# Patient Record
Sex: FEMALE | Race: WHITE | NOT HISPANIC OR LATINO | Employment: UNEMPLOYED | URBAN - NONMETROPOLITAN AREA
[De-identification: names, ages, dates, MRNs, and addresses within clinical notes are randomized per-mention and may not be internally consistent; named-entity substitution may affect disease eponyms.]

---

## 2023-06-26 ENCOUNTER — OFFICE VISIT (OUTPATIENT)
Dept: PRIMARY CARE | Facility: CLINIC | Age: 63
End: 2023-06-26
Payer: COMMERCIAL

## 2023-06-26 VITALS
WEIGHT: 149 LBS | DIASTOLIC BLOOD PRESSURE: 64 MMHG | HEIGHT: 60 IN | BODY MASS INDEX: 29.25 KG/M2 | HEART RATE: 81 BPM | OXYGEN SATURATION: 97 % | SYSTOLIC BLOOD PRESSURE: 110 MMHG

## 2023-06-26 DIAGNOSIS — F51.04 PSYCHOPHYSIOLOGICAL INSOMNIA: ICD-10-CM

## 2023-06-26 DIAGNOSIS — Z12.31 SCREENING MAMMOGRAM, ENCOUNTER FOR: ICD-10-CM

## 2023-06-26 DIAGNOSIS — G89.29 CHRONIC MIDLINE LOW BACK PAIN WITHOUT SCIATICA: ICD-10-CM

## 2023-06-26 DIAGNOSIS — E78.00 PURE HYPERCHOLESTEROLEMIA: ICD-10-CM

## 2023-06-26 DIAGNOSIS — M54.50 CHRONIC MIDLINE LOW BACK PAIN WITHOUT SCIATICA: ICD-10-CM

## 2023-06-26 DIAGNOSIS — Z12.11 COLON CANCER SCREENING: ICD-10-CM

## 2023-06-26 DIAGNOSIS — Z13.220 LIPID SCREENING: ICD-10-CM

## 2023-06-26 DIAGNOSIS — Z13.0 SCREENING FOR DEFICIENCY ANEMIA: ICD-10-CM

## 2023-06-26 DIAGNOSIS — Z00.00 ROUTINE GENERAL MEDICAL EXAMINATION AT A HEALTH CARE FACILITY: Primary | ICD-10-CM

## 2023-06-26 DIAGNOSIS — F40.240 CLAUSTROPHOBIA: ICD-10-CM

## 2023-06-26 DIAGNOSIS — Z13.1 DIABETES MELLITUS SCREENING: ICD-10-CM

## 2023-06-26 LAB
ALANINE AMINOTRANSFERASE (SGPT) (U/L) IN SER/PLAS: 16 U/L (ref 7–45)
ALBUMIN (G/DL) IN SER/PLAS: 4.2 G/DL (ref 3.4–5)
ALKALINE PHOSPHATASE (U/L) IN SER/PLAS: 55 U/L (ref 33–136)
ANION GAP IN SER/PLAS: 13 MMOL/L (ref 10–20)
ASPARTATE AMINOTRANSFERASE (SGOT) (U/L) IN SER/PLAS: 14 U/L (ref 9–39)
BILIRUBIN TOTAL (MG/DL) IN SER/PLAS: 0.3 MG/DL (ref 0–1.2)
CALCIUM (MG/DL) IN SER/PLAS: 9.1 MG/DL (ref 8.6–10.3)
CARBON DIOXIDE, TOTAL (MMOL/L) IN SER/PLAS: 28 MMOL/L (ref 21–32)
CHLORIDE (MMOL/L) IN SER/PLAS: 103 MMOL/L (ref 98–107)
CHOLESTEROL (MG/DL) IN SER/PLAS: 178 MG/DL (ref 0–199)
CHOLESTEROL IN HDL (MG/DL) IN SER/PLAS: 68.5 MG/DL
CHOLESTEROL/HDL RATIO: 2.6
CREATININE (MG/DL) IN SER/PLAS: 0.89 MG/DL (ref 0.5–1.05)
ERYTHROCYTE DISTRIBUTION WIDTH (RATIO) BY AUTOMATED COUNT: 12.4 % (ref 11.5–14.5)
ERYTHROCYTE MEAN CORPUSCULAR HEMOGLOBIN CONCENTRATION (G/DL) BY AUTOMATED: 32.4 G/DL (ref 32–36)
ERYTHROCYTE MEAN CORPUSCULAR VOLUME (FL) BY AUTOMATED COUNT: 93 FL (ref 80–100)
ERYTHROCYTES (10*6/UL) IN BLOOD BY AUTOMATED COUNT: 4.9 X10E12/L (ref 4–5.2)
GFR FEMALE: 73 ML/MIN/1.73M2
GLUCOSE (MG/DL) IN SER/PLAS: 94 MG/DL (ref 74–99)
HEMATOCRIT (%) IN BLOOD BY AUTOMATED COUNT: 45.4 % (ref 36–46)
HEMOGLOBIN (G/DL) IN BLOOD: 14.7 G/DL (ref 12–16)
LDL: 76 MG/DL (ref 0–99)
LEUKOCYTES (10*3/UL) IN BLOOD BY AUTOMATED COUNT: 5.7 X10E9/L (ref 4.4–11.3)
PLATELETS (10*3/UL) IN BLOOD AUTOMATED COUNT: 276 X10E9/L (ref 150–450)
POTASSIUM (MMOL/L) IN SER/PLAS: 4.2 MMOL/L (ref 3.5–5.3)
PROTEIN TOTAL: 6.1 G/DL (ref 6.4–8.2)
SODIUM (MMOL/L) IN SER/PLAS: 140 MMOL/L (ref 136–145)
TRIGLYCERIDE (MG/DL) IN SER/PLAS: 166 MG/DL (ref 0–149)
UREA NITROGEN (MG/DL) IN SER/PLAS: 14 MG/DL (ref 6–23)
VLDL: 33 MG/DL (ref 0–40)

## 2023-06-26 PROCEDURE — 85027 COMPLETE CBC AUTOMATED: CPT

## 2023-06-26 PROCEDURE — 80061 LIPID PANEL: CPT

## 2023-06-26 PROCEDURE — 99396 PREV VISIT EST AGE 40-64: CPT | Performed by: FAMILY MEDICINE

## 2023-06-26 PROCEDURE — 1036F TOBACCO NON-USER: CPT | Performed by: FAMILY MEDICINE

## 2023-06-26 PROCEDURE — 80053 COMPREHEN METABOLIC PANEL: CPT

## 2023-06-26 RX ORDER — ROSUVASTATIN CALCIUM 10 MG/1
10 TABLET, COATED ORAL DAILY
COMMUNITY
End: 2023-06-26 | Stop reason: SDUPTHER

## 2023-06-26 RX ORDER — ROSUVASTATIN CALCIUM 10 MG/1
10 TABLET, COATED ORAL DAILY
Qty: 90 TABLET | Refills: 3 | Status: SHIPPED | OUTPATIENT
Start: 2023-06-26

## 2023-06-26 RX ORDER — ASPIRIN 81 MG/1
1 TABLET ORAL DAILY
COMMUNITY
Start: 2021-01-13

## 2023-06-26 RX ORDER — HYDROXYZINE PAMOATE 50 MG/1
50-100 CAPSULE ORAL NIGHTLY PRN
Qty: 60 CAPSULE | Refills: 2 | Status: SHIPPED | OUTPATIENT
Start: 2023-06-26 | End: 2023-09-24

## 2023-06-26 RX ORDER — ALPRAZOLAM 0.25 MG/1
0.25 TABLET ORAL 3 TIMES DAILY PRN
Qty: 4 TABLET | Refills: 0 | Status: SHIPPED | OUTPATIENT
Start: 2023-06-26 | End: 2024-02-21

## 2023-06-26 ASSESSMENT — PATIENT HEALTH QUESTIONNAIRE - PHQ9
2. FEELING DOWN, DEPRESSED OR HOPELESS: NOT AT ALL
1. LITTLE INTEREST OR PLEASURE IN DOING THINGS: NOT AT ALL
SUM OF ALL RESPONSES TO PHQ9 QUESTIONS 1 AND 2: 0

## 2023-06-26 ASSESSMENT — ENCOUNTER SYMPTOMS
FEVER: 0
SHORTNESS OF BREATH: 0
CONFUSION: 0
PALPITATIONS: 0
NERVOUS/ANXIOUS: 0
COLOR CHANGE: 0
TROUBLE SWALLOWING: 0
BLOOD IN STOOL: 0
CONSTIPATION: 0
HEMATURIA: 0
UNEXPECTED WEIGHT CHANGE: 0
APPETITE CHANGE: 0
DIARRHEA: 0
BACK PAIN: 1
SEIZURES: 0
DIFFICULTY URINATING: 0
JOINT SWELLING: 0

## 2023-06-26 NOTE — PROGRESS NOTES
"Subjective   Patient ID: Mayra Tena is a 62 y.o. female who presents for Hyperlipidemia and Wellness visit.  1. Hyperlipidemia:  Taking crestor w/o problems    2. LBP:  At least 15y of pain but increasing  Tried to get MRI  but claustrophobia  No radiculopathy  Increased w/ sitting/standing/bending  No trouble hold bowels/urine   Using pain patch, otc meds    3. Having hard time sleeping:;  Waking q2hr  Was on trazodone w/o improvement  States her mind is racing          Review of Systems   Constitutional:  Negative for appetite change, fever and unexpected weight change.   HENT:  Negative for congestion and trouble swallowing.    Eyes:  Negative for visual disturbance.   Respiratory:  Negative for shortness of breath.    Cardiovascular:  Negative for chest pain, palpitations and leg swelling.   Gastrointestinal:  Negative for blood in stool, constipation and diarrhea.   Genitourinary:  Negative for difficulty urinating and hematuria.   Musculoskeletal:  Positive for back pain. Negative for gait problem and joint swelling.   Skin:  Negative for color change.   Allergic/Immunologic: Negative for immunocompromised state.   Neurological:  Negative for seizures and syncope.   Psychiatric/Behavioral:  Negative for confusion and suicidal ideas. The patient is not nervous/anxious.        Objective   /64   Pulse 81   Ht 1.511 m (4' 11.5\")   Wt 67.6 kg (149 lb)   SpO2 97%   BMI 29.59 kg/m²     Physical Exam  Constitutional:       General: She is not in acute distress.     Appearance: Normal appearance. She is not ill-appearing.   HENT:      Head: Normocephalic and atraumatic.      Right Ear: Tympanic membrane normal.      Left Ear: Tympanic membrane normal.      Nose: Nose normal.      Mouth/Throat:      Mouth: Mucous membranes are moist.   Eyes:      Pupils: Pupils are equal, round, and reactive to light.   Cardiovascular:      Rate and Rhythm: Normal rate and regular rhythm.      Heart sounds: No murmur " heard.     No friction rub. No gallop.   Pulmonary:      Effort: Pulmonary effort is normal.      Breath sounds: Normal breath sounds.   Abdominal:      General: Abdomen is flat. There is no distension.      Palpations: Abdomen is soft.      Tenderness: There is no abdominal tenderness. There is no guarding.      Hernia: No hernia is present.   Musculoskeletal:         General: Normal range of motion.      Comments: +straight leg raise, slow but normal gait, increase pain w/ bending. 5/5 LE strength    Skin:     General: Skin is warm and dry.   Neurological:      General: No focal deficit present.      Mental Status: She is alert. Mental status is at baseline.      Cranial Nerves: No cranial nerve deficit.      Motor: No weakness.      Gait: Gait normal.   Psychiatric:         Mood and Affect: Mood normal.         Behavior: Behavior normal.         Thought Content: Thought content normal.         Judgment: Judgment normal.         Assessment/Plan   Problem List Items Addressed This Visit    None  Visit Diagnoses       Screening for deficiency anemia    -  Primary    Relevant Orders    CBC    Diabetes mellitus screening        Relevant Orders    Comprehensive Metabolic Panel    Lipid screening        Relevant Orders    Lipid Panel    Colon cancer screening        Relevant Orders    Cologuard® colon cancer screening    Screening mammogram, encounter for        Relevant Orders    BI mammo bilateral screening tomosynthesis    Psychophysiological insomnia        Relevant Medications    hydrOXYzine pamoate (VistariL) 50 mg capsule    Pure hypercholesterolemia        Relevant Medications    rosuvastatin (Crestor) 10 mg tablet    Claustrophobia        Chronic midline low back pain without sciatica        Relevant Medications    ALPRAZolam (Xanax) 0.25 mg tablet          Assessment:  #Prediabetes    #Hyperlipidemia: LDL >190 (8/22)  -Crestor 10mg     #LBP: concerning for spinal stenosis   -xray today  -MRI for advantage and  xanax prn      Health Maintenance Reminder:  -Blood Work: today  -ASA: 81mg   -Hep C: completed  -Colonoscopy: cologuard ordered  -mammo: ordered  -dexa: ordered  -pap: hysterectomy for benigh reasons   -Shingrix: completed   -Pneumovax: >65y  -Flu: Yearly

## 2023-07-06 LAB — NONINV COLON CA DNA+OCC BLD SCRN STL QL: NEGATIVE

## 2023-07-22 DIAGNOSIS — M81.0 AGE-RELATED OSTEOPOROSIS WITHOUT CURRENT PATHOLOGICAL FRACTURE: Primary | ICD-10-CM

## 2023-07-22 RX ORDER — ALENDRONATE SODIUM 70 MG/1
70 TABLET ORAL
Qty: 12 TABLET | Refills: 3 | Status: SHIPPED | OUTPATIENT
Start: 2023-07-22 | End: 2024-07-21

## 2024-01-22 DIAGNOSIS — K12.0 CANKER SORES ORAL: Primary | ICD-10-CM

## 2024-01-22 RX ORDER — PREDNISONE 20 MG/1
TABLET ORAL
Qty: 60 TABLET | Refills: 0 | Status: SHIPPED | OUTPATIENT
Start: 2024-01-22

## 2024-06-13 DIAGNOSIS — K12.0 CANKER SORES ORAL: ICD-10-CM

## 2024-06-14 RX ORDER — PREDNISONE 20 MG/1
TABLET ORAL
Qty: 60 TABLET | Refills: 0 | Status: SHIPPED | OUTPATIENT
Start: 2024-06-14

## 2024-07-30 ENCOUNTER — APPOINTMENT (OUTPATIENT)
Dept: PRIMARY CARE | Facility: CLINIC | Age: 64
End: 2024-07-30
Payer: COMMERCIAL

## 2024-07-30 VITALS
DIASTOLIC BLOOD PRESSURE: 80 MMHG | WEIGHT: 126 LBS | OXYGEN SATURATION: 97 % | SYSTOLIC BLOOD PRESSURE: 104 MMHG | BODY MASS INDEX: 25.02 KG/M2 | HEART RATE: 79 BPM

## 2024-07-30 DIAGNOSIS — K12.0 CANKER SORES ORAL: ICD-10-CM

## 2024-07-30 DIAGNOSIS — E78.00 PURE HYPERCHOLESTEROLEMIA: ICD-10-CM

## 2024-07-30 DIAGNOSIS — F51.04 PSYCHOPHYSIOLOGICAL INSOMNIA: Primary | ICD-10-CM

## 2024-07-30 DIAGNOSIS — M81.0 AGE-RELATED OSTEOPOROSIS WITHOUT CURRENT PATHOLOGICAL FRACTURE: ICD-10-CM

## 2024-07-30 PROCEDURE — 99213 OFFICE O/P EST LOW 20 MIN: CPT | Performed by: FAMILY MEDICINE

## 2024-07-30 PROCEDURE — 1036F TOBACCO NON-USER: CPT | Performed by: FAMILY MEDICINE

## 2024-07-30 RX ORDER — PREDNISONE 20 MG/1
TABLET ORAL
Qty: 60 TABLET | Refills: 2 | Status: SHIPPED | OUTPATIENT
Start: 2024-07-30

## 2024-07-30 RX ORDER — ROSUVASTATIN CALCIUM 10 MG/1
10 TABLET, COATED ORAL DAILY
Qty: 90 TABLET | Refills: 3 | Status: SHIPPED | OUTPATIENT
Start: 2024-07-30

## 2024-07-30 RX ORDER — ZOLPIDEM TARTRATE 5 MG/1
5 TABLET ORAL NIGHTLY PRN
Qty: 30 TABLET | Refills: 5 | Status: SHIPPED | OUTPATIENT
Start: 2024-07-30 | End: 2025-01-26

## 2024-07-30 RX ORDER — ALENDRONATE SODIUM 70 MG/1
70 TABLET ORAL
Qty: 12 TABLET | Refills: 3 | Status: SHIPPED | OUTPATIENT
Start: 2024-07-30 | End: 2025-07-30

## 2024-07-30 ASSESSMENT — ENCOUNTER SYMPTOMS
FEVER: 0
BLOOD IN STOOL: 0
UNEXPECTED WEIGHT CHANGE: 0
DIFFICULTY URINATING: 0
SHORTNESS OF BREATH: 0
DIARRHEA: 0
CONSTIPATION: 0
BACK PAIN: 1
HEMATURIA: 0
NERVOUS/ANXIOUS: 0
CONFUSION: 0
JOINT SWELLING: 0
APPETITE CHANGE: 0
TROUBLE SWALLOWING: 0
COLOR CHANGE: 0
PALPITATIONS: 0
SEIZURES: 0

## 2024-07-30 NOTE — PROGRESS NOTES
Subjective   Patient ID: Mayra Tena is a 63 y.o. female who presents for Med Refill (Needs refills).  -Self pay now  -HLD:  Taking crestor still    -LBP:  Not bad at this time  MRI done last year  Right now pain is manageable    -anxiety:  Having trouble falling asleep 2/2 mind racing  Failed trazodone and vistaril  Refuses remeron 2/2 wt gain  A friend is on ambien        Med Refill  Pertinent negatives include no chest pain, congestion, fever or joint swelling.       Review of Systems   Constitutional:  Negative for appetite change, fever and unexpected weight change.   HENT:  Negative for congestion and trouble swallowing.    Eyes:  Negative for visual disturbance.   Respiratory:  Negative for shortness of breath.    Cardiovascular:  Negative for chest pain, palpitations and leg swelling.   Gastrointestinal:  Negative for blood in stool, constipation and diarrhea.   Genitourinary:  Negative for difficulty urinating and hematuria.   Musculoskeletal:  Positive for back pain. Negative for gait problem and joint swelling.   Skin:  Negative for color change.   Allergic/Immunologic: Negative for immunocompromised state.   Neurological:  Negative for seizures and syncope.   Psychiatric/Behavioral:  Negative for confusion and suicidal ideas. The patient is not nervous/anxious.        Objective   /80 (BP Location: Left arm, Patient Position: Sitting, BP Cuff Size: Adult)   Pulse 79   Wt 57.2 kg (126 lb)   SpO2 97%   BMI 25.02 kg/m²     Physical Exam  Constitutional:       General: She is not in acute distress.     Appearance: Normal appearance. She is not ill-appearing.   HENT:      Head: Normocephalic and atraumatic.      Right Ear: Tympanic membrane normal.      Left Ear: Tympanic membrane normal.      Nose: Nose normal.      Mouth/Throat:      Mouth: Mucous membranes are moist.   Eyes:      Pupils: Pupils are equal, round, and reactive to light.   Cardiovascular:      Rate and Rhythm: Normal rate and  regular rhythm.      Heart sounds: No murmur heard.     No friction rub. No gallop.   Pulmonary:      Effort: Pulmonary effort is normal.      Breath sounds: Normal breath sounds.   Abdominal:      General: Abdomen is flat. There is no distension.      Palpations: Abdomen is soft.      Tenderness: There is no abdominal tenderness. There is no guarding.      Hernia: No hernia is present.   Musculoskeletal:         General: Normal range of motion.   Skin:     General: Skin is warm and dry.   Neurological:      General: No focal deficit present.      Mental Status: She is alert. Mental status is at baseline.      Cranial Nerves: No cranial nerve deficit.      Motor: No weakness.      Gait: Gait normal.   Psychiatric:         Mood and Affect: Mood normal.         Behavior: Behavior normal.         Thought Content: Thought content normal.         Judgment: Judgment normal.         Assessment/Plan   Problem List Items Addressed This Visit    None  Visit Diagnoses       Psychophysiological insomnia    -  Primary    Relevant Medications    zolpidem (Ambien) 5 mg tablet    Age-related osteoporosis without current pathological fracture        Relevant Medications    alendronate (Fosamax) 70 mg tablet    Pure hypercholesterolemia        Relevant Medications    rosuvastatin (Crestor) 10 mg tablet    Canker sores oral        Relevant Medications    predniSONE (Deltasone) 20 mg tablet            Assessment:  #Prediabetes    #Hyperlipidemia: LDL >190 (8/22)  -Crestor 10mg     #LBP:MRI: L4/5 mild right foraminal stenosis only (7/23)    #Osteoporosis:  -vit d and calcium  -fosamax    #insomnia:   -failed trazodone 150mg, vistaril  -refuses remeron  -trial of ambien 5mg- warned in length the risk. OARRs reviewed. Will needs UDS/contract if she continues medicine    Health Maintenance Reminder:  -Blood Work: today  -ASA: 81mg   -Hep C: completed  -Colonoscopy: cologuard 6/26  -mammo: 7/24- [facility]   -dexa: 7/25  -pap: hysterectomy for  benigh reasons   -Shingrix: completed   -Pneumovax: >65y  -Flu: Yearly

## 2024-08-09 ENCOUNTER — HOSPITAL ENCOUNTER (OUTPATIENT)
Dept: RADIOLOGY | Facility: CLINIC | Age: 64
Discharge: HOME | End: 2024-08-09

## 2024-08-09 ENCOUNTER — OFFICE VISIT (OUTPATIENT)
Dept: PRIMARY CARE | Facility: CLINIC | Age: 64
End: 2024-08-09

## 2024-08-09 VITALS
SYSTOLIC BLOOD PRESSURE: 108 MMHG | WEIGHT: 127 LBS | OXYGEN SATURATION: 98 % | HEART RATE: 69 BPM | DIASTOLIC BLOOD PRESSURE: 68 MMHG | BODY MASS INDEX: 25.22 KG/M2

## 2024-08-09 DIAGNOSIS — M79.672 LEFT FOOT PAIN: Primary | ICD-10-CM

## 2024-08-09 DIAGNOSIS — M79.672 LEFT FOOT PAIN: ICD-10-CM

## 2024-08-09 PROCEDURE — 1036F TOBACCO NON-USER: CPT

## 2024-08-09 PROCEDURE — 99213 OFFICE O/P EST LOW 20 MIN: CPT

## 2024-08-09 PROCEDURE — 73630 X-RAY EXAM OF FOOT: CPT | Mod: LT

## 2024-08-09 NOTE — PROGRESS NOTES
Subjective   Patient ID: Mayra Tena is a 63 y.o. female who presents for pain Lt foot (Hurt LT foot 6 wks ago).  HPI  Mayra presents for pain in L foot, she injured it 6 weeks ago   She thinks she broke her 4th toe, she buddy tapped 3rd and 4ths toes together for 3 weeks  She states that she thinks it was getting better but then swelling started a week ago in the top of her L foot  She can walk but has a limp at times  Sometimes a little weight on her foot feels better and then sometimes has to put feet up to feel better  Has not tried heat or ice   Takes either tylenol or ibuprofen just as needed   No numbness or tingling     Past Surgical History:   Procedure Laterality Date    CHOLECYSTECTOMY  12/02/2013    Cholecystectomy    KNEE ARTHROSCOPY W/ DEBRIDEMENT  12/02/2013    Knee Arthroscopy (Therapeutic)    KNEE ARTHROSCOPY W/ DEBRIDEMENT  12/02/2013    Knee Arthroscopy (Therapeutic)    TOTAL ABDOMINAL HYSTERECTOMY  03/10/2020    Total Abdominal Hysterectomy      Past Medical History:   Diagnosis Date    Acute bronchitis due to other specified organisms 04/18/2016    Acute bronchitis due to other specified organisms    Cutaneous abscess of limb, unspecified 12/02/2013    Abscess of axilla    Encounter for gynecological examination (general) (routine) without abnormal findings 06/22/2016    Well woman exam with routine gynecological exam    Encounter for screening for diabetes mellitus 01/19/2015    Diabetes mellitus screening    Encounter for screening for diseases of the blood and blood-forming organs and certain disorders involving the immune mechanism 01/19/2015    Screening for deficiency anemia    Encounter for screening for lipoid disorders 01/19/2015    Screening, lipid    Encounter for screening for malignant neoplasm of colon 06/22/2016    Colon cancer screening    Encounter for screening for malignant neoplasm of colon 01/19/2015    Colon cancer screening    Encounter for screening for other viral  diseases 01/19/2015    Need for hepatitis C screening test    Encounter for screening mammogram for malignant neoplasm of breast 01/19/2015    Visit for screening mammogram    Nontoxic multinodular goiter 07/28/2021    Nontoxic multinodular goiter    Personal history of (healed) traumatic fracture 07/06/2015    History of fracture of phalanx of toe    Personal history of diseases of the skin and subcutaneous tissue 11/16/2015    History of impetigo    Personal history of diseases of the skin and subcutaneous tissue 07/06/2015    History of contact dermatitis    Personal history of other diseases of the respiratory system 04/27/2015    History of acute bronchitis    Personal history of other specified conditions 06/22/2016    History of neck swelling    Personal history of other specified conditions 07/06/2015    History of fatigue     Social History     Tobacco Use    Smoking status: Never    Smokeless tobacco: Never        Review of Systems  10 point review of systems performed and is negative except as noted in the HPI.      Current Outpatient Medications:     alendronate (Fosamax) 70 mg tablet, Take 1 tablet (70 mg) by mouth every 7 days. Take in the morning with a full glass of water, on an empty stomach, and do not take anything else by mouth or lie down for the next 30 min., Disp: 12 tablet, Rfl: 3    aspirin 81 mg EC tablet, Take 1 tablet (81 mg) by mouth once daily., Disp: , Rfl:     rosuvastatin (Crestor) 10 mg tablet, Take 1 tablet (10 mg) by mouth once daily., Disp: 90 tablet, Rfl: 3    zolpidem (Ambien) 5 mg tablet, Take 1 tablet (5 mg) by mouth as needed at bedtime for sleep., Disp: 30 tablet, Rfl: 5    predniSONE (Deltasone) 20 mg tablet, Take 1-2 tabs po every day for 2d prn canker sore (Patient not taking: Reported on 8/9/2024), Disp: 60 tablet, Rfl: 2     Objective   /68   Pulse 69   Wt 57.6 kg (127 lb)   SpO2 98%   BMI 25.22 kg/m²     Physical Exam  Constitutional:       Appearance:  Normal appearance.   HENT:      Head: Normocephalic and atraumatic.   Musculoskeletal:      Right ankle: Normal. No swelling or ecchymosis. No tenderness. Normal pulse.      Right Achilles Tendon: Normal. No tenderness.      Right foot: Normal range of motion and normal capillary refill. Swelling (over top of forefoot) and tenderness (over 4th metatarsal and over top of forefoot) present. Normal pulse.   Skin:     General: Skin is warm and dry.      Findings: No bruising or erythema.   Neurological:      Mental Status: She is alert and oriented to person, place, and time.         Assessment/Plan   Problem List Items Addressed This Visit    None  Visit Diagnoses       Left foot pain    -  Primary    Relevant Orders    XR foot left 3+ views          L foot pain   Xray of L foot - reviewed by Dr. Abdul and myself, healing 4th metatarsal fracture felt to be visualized. No additional fractures seen. Final result for imaging is still pending from Radiology.   6 weeks out from injury, recommend she wear hard soled shoes, ice, rest, and ibuprofen   If pain persists then recommend she follow up, possibly referral to podiatry       Discussed at visit any disease processes that were of concern as well as the risks, benefits and instructions on any new medication provided. Patient (and/or caretaker of patient if present) stated all questions were answered, and they voiced understanding of instructions.

## 2024-08-09 NOTE — PATIENT INSTRUCTIONS
We will call with the xray result  Ibuprofen for a couple days  Ice 4 times a day for 15-20 minutes  Call if not improving  Good supportive shoes

## 2024-11-04 DIAGNOSIS — F51.04 PSYCHOPHYSIOLOGICAL INSOMNIA: ICD-10-CM

## 2024-11-05 RX ORDER — ZOLPIDEM TARTRATE 5 MG/1
5 TABLET ORAL NIGHTLY PRN
Qty: 30 TABLET | Refills: 0 | Status: SHIPPED | OUTPATIENT
Start: 2024-11-05

## 2025-02-07 DIAGNOSIS — F51.04 PSYCHOPHYSIOLOGICAL INSOMNIA: ICD-10-CM

## 2025-02-07 RX ORDER — ZOLPIDEM TARTRATE 5 MG/1
5 TABLET ORAL NIGHTLY PRN
Qty: 30 TABLET | Refills: 2 | Status: SHIPPED | OUTPATIENT
Start: 2025-02-07

## 2025-02-24 DIAGNOSIS — F51.04 PSYCHOPHYSIOLOGICAL INSOMNIA: ICD-10-CM

## 2025-02-25 RX ORDER — ZOLPIDEM TARTRATE 5 MG/1
5 TABLET ORAL NIGHTLY PRN
Qty: 30 TABLET | Refills: 2 | Status: SHIPPED | OUTPATIENT
Start: 2025-02-25

## 2025-03-27 DIAGNOSIS — F51.04 PSYCHOPHYSIOLOGICAL INSOMNIA: ICD-10-CM

## 2025-04-02 DIAGNOSIS — F51.04 PSYCHOPHYSIOLOGICAL INSOMNIA: ICD-10-CM

## 2025-04-03 RX ORDER — ZOLPIDEM TARTRATE 5 MG/1
5 TABLET ORAL NIGHTLY PRN
Qty: 30 TABLET | Refills: 0 | Status: SHIPPED | OUTPATIENT
Start: 2025-04-03

## 2025-04-03 NOTE — PROGRESS NOTES
I have personally reviewed the OARRS report for this person. I find it to be appropriate.   I have considered the risk of abuse, dependence, addiction and diversion.  I believe that it is clinically appropriate for this person to be prescribed this medication for the documented diagnoses.   OARRS report was reviewed on any day a prescription is written for a controlled substance.      Patient was previously being seen by Dr. Stratton, she will be transferring to me, I will updated UDS and CSA when in the office. Will fill until appointment.

## 2025-04-04 RX ORDER — ZOLPIDEM TARTRATE 5 MG/1
5 TABLET ORAL NIGHTLY PRN
Qty: 30 TABLET | Refills: 0 | Status: SHIPPED | OUTPATIENT
Start: 2025-04-04

## 2025-04-11 ENCOUNTER — APPOINTMENT (OUTPATIENT)
Dept: PRIMARY CARE | Facility: CLINIC | Age: 65
End: 2025-04-11

## 2025-04-11 VITALS
HEIGHT: 60 IN | SYSTOLIC BLOOD PRESSURE: 103 MMHG | OXYGEN SATURATION: 95 % | WEIGHT: 130 LBS | DIASTOLIC BLOOD PRESSURE: 69 MMHG | HEART RATE: 76 BPM | BODY MASS INDEX: 25.52 KG/M2

## 2025-04-11 DIAGNOSIS — I73.00 RAYNAUD'S PHENOMENON WITHOUT GANGRENE: ICD-10-CM

## 2025-04-11 DIAGNOSIS — E78.00 PURE HYPERCHOLESTEROLEMIA: Primary | ICD-10-CM

## 2025-04-11 DIAGNOSIS — Z13.1 DIABETES MELLITUS SCREENING: ICD-10-CM

## 2025-04-11 DIAGNOSIS — F51.04 PSYCHOPHYSIOLOGICAL INSOMNIA: ICD-10-CM

## 2025-04-11 LAB — POC HEMOGLOBIN A1C: 5.5 % (ref 4.2–6.5)

## 2025-04-11 PROCEDURE — 83036 HEMOGLOBIN GLYCOSYLATED A1C: CPT

## 2025-04-11 PROCEDURE — 1036F TOBACCO NON-USER: CPT

## 2025-04-11 PROCEDURE — 99214 OFFICE O/P EST MOD 30 MIN: CPT

## 2025-04-11 PROCEDURE — 3008F BODY MASS INDEX DOCD: CPT

## 2025-04-11 ASSESSMENT — PATIENT HEALTH QUESTIONNAIRE - PHQ9
1. LITTLE INTEREST OR PLEASURE IN DOING THINGS: NOT AT ALL
2. FEELING DOWN, DEPRESSED OR HOPELESS: NOT AT ALL
SUM OF ALL RESPONSES TO PHQ9 QUESTIONS 1 AND 2: 0

## 2025-04-11 NOTE — PATIENT INSTRUCTIONS
[facility] 's   (Breast Cancer and Cervical Cancer Prevention )  phone number  is [phone].     This is a program who will cover the costs of breast and pelvic exams, pap smears, and mammograms, as well as the follow up to any abnormalities found with those tests.      They  help women who are uninsured or under insured.     If you haven't yet,  please give them a call to see if you qualify for the program.    If you do,  they will have paperwork for you to fill out and send back.     If you have qualified for the program,  they will have to set up all appointments for you.     If you are Scientologist, the [facility] ( an [facility]) sponsors periodic clinics with [facility].  You can ask the [facility] people if you could have an appointment there  OR  in the office with me.       If you are non-Scientologist, your visit will only be here in the office.      If you already have your order for a mammogram,  be sure to contact them to set up the mammogram appointment.     Be sure to take your mammogram order with you to your appointment, along with any [facility] paperwork you may have.     If you have already had your testing, you will always hear about your results.  So if 3 weeks go by, and you have not heard anything, please let either myself or the people at [facility] know.     And,  please tell any of your friends who may not have insurance about this fantastic program!

## 2025-04-11 NOTE — PROGRESS NOTES
Subjective   Patient ID: Mayra Tena is a 64 y.o. female who presents for Med Refill.  HPI  Mayra presents for check up     A couple concerns:     Borderline diabetic for awhile  -Gets low blood sugar   -54, sometimes in the 70s  -Gets shaky     Wondering about poor circulation   -Finger tips go numb  -Feet get so cold it hurts   -If she touches them with her hand it hurts   -Finger tips turn white     Chronic issues:    Insomnia   -Ambien 5mg  -Was out of a couple days, could not sleep   -If she did fall asleep brain was still thinking   -Tried trazodone in the past did not help with falling asleep     Osteoporosis is doing okay   -Taking fosamax     Rosuvastatin 10mg    Health Maintenance Reminder:  -Blood Work: lipid, cmp, uds today   -ASA: 81mg   -Hep C: completed  -Colonoscopy: cologuard negative 6/23, due in 6/26  -mammo: was due 7/24- [facility] info given for her to try to do this  -dexa: due 7/25, would like to wait until they have insurance again   -pap: hysterectomy for benign reasons   -Shingrix: completed   -Pneumovax: >65y  -Flu: Yearly         Past Surgical History:   Procedure Laterality Date    CHOLECYSTECTOMY  12/02/2013    Cholecystectomy    KNEE ARTHROSCOPY W/ DEBRIDEMENT  12/02/2013    Knee Arthroscopy (Therapeutic)    KNEE ARTHROSCOPY W/ DEBRIDEMENT  12/02/2013    Knee Arthroscopy (Therapeutic)    TOTAL ABDOMINAL HYSTERECTOMY  03/10/2020    Total Abdominal Hysterectomy      Past Medical History:   Diagnosis Date    Acute bronchitis due to other specified organisms 04/18/2016    Acute bronchitis due to other specified organisms    Cutaneous abscess of limb, unspecified 12/02/2013    Abscess of axilla    Encounter for gynecological examination (general) (routine) without abnormal findings 06/22/2016    Well woman exam with routine gynecological exam    Encounter for screening for diabetes mellitus 01/19/2015    Diabetes mellitus screening    Encounter for screening for diseases of the blood and  blood-forming organs and certain disorders involving the immune mechanism 01/19/2015    Screening for deficiency anemia    Encounter for screening for lipoid disorders 01/19/2015    Screening, lipid    Encounter for screening for malignant neoplasm of colon 06/22/2016    Colon cancer screening    Encounter for screening for malignant neoplasm of colon 01/19/2015    Colon cancer screening    Encounter for screening for other viral diseases 01/19/2015    Need for hepatitis C screening test    Encounter for screening mammogram for malignant neoplasm of breast 01/19/2015    Visit for screening mammogram    Nontoxic multinodular goiter 07/28/2021    Nontoxic multinodular goiter    Personal history of (healed) traumatic fracture 07/06/2015    History of fracture of phalanx of toe    Personal history of diseases of the skin and subcutaneous tissue 11/16/2015    History of impetigo    Personal history of diseases of the skin and subcutaneous tissue 07/06/2015    History of contact dermatitis    Personal history of other diseases of the respiratory system 04/27/2015    History of acute bronchitis    Personal history of other specified conditions 06/22/2016    History of neck swelling    Personal history of other specified conditions 07/06/2015    History of fatigue     Social History     Tobacco Use    Smoking status: Never    Smokeless tobacco: Never   Vaping Use    Vaping status: Never Used        Review of Systems  10 point review of systems performed and is negative except as noted in the HPI.      Current Outpatient Medications:     alendronate (Fosamax) 70 mg tablet, Take 1 tablet (70 mg) by mouth every 7 days. Take in the morning with a full glass of water, on an empty stomach, and do not take anything else by mouth or lie down for the next 30 min., Disp: 12 tablet, Rfl: 3    aspirin 81 mg EC tablet, Take 1 tablet (81 mg) by mouth once daily., Disp: , Rfl:     rosuvastatin (Crestor) 10 mg tablet, Take 1 tablet (10 mg)  by mouth once daily., Disp: 90 tablet, Rfl: 3    zolpidem (Ambien) 5 mg tablet, TAKE ONE TABLET BY MOUTH AT BEDTIME AS NEEDED FOR SLEEP, Disp: 30 tablet, Rfl: 0    zolpidem (Ambien) 5 mg tablet, Take 1 tablet (5 mg) by mouth as needed at bedtime for sleep. No refills until visit scheduled, Disp: 30 tablet, Rfl: 0    predniSONE (Deltasone) 20 mg tablet, Take 1-2 tabs po every day for 2d prn canker sore (Patient not taking: Reported on 8/9/2024), Disp: 60 tablet, Rfl: 2     Objective   /69   Pulse 76   Ht 1.524 m (5')   Wt 59 kg (130 lb)   SpO2 95%   BMI 25.39 kg/m²     Physical Exam  Constitutional:       General: She is not in acute distress.     Appearance: Normal appearance. She is not ill-appearing or toxic-appearing.   HENT:      Head: Normocephalic and atraumatic.      Right Ear: Tympanic membrane, ear canal and external ear normal.      Left Ear: Tympanic membrane, ear canal and external ear normal.      Mouth/Throat:      Mouth: Mucous membranes are moist.      Pharynx: Oropharynx is clear. No oropharyngeal exudate or posterior oropharyngeal erythema.   Eyes:      Conjunctiva/sclera: Conjunctivae normal.      Pupils: Pupils are equal, round, and reactive to light.   Neck:      Vascular: No carotid bruit.   Cardiovascular:      Rate and Rhythm: Normal rate and regular rhythm.      Pulses: Normal pulses.      Heart sounds: Normal heart sounds. No murmur heard.  Pulmonary:      Effort: Pulmonary effort is normal.      Breath sounds: Normal breath sounds. No wheezing, rhonchi or rales.   Abdominal:      General: Bowel sounds are normal. There is no distension.      Palpations: Abdomen is soft.      Tenderness: There is no abdominal tenderness. There is no guarding or rebound.   Musculoskeletal:         General: Normal range of motion.      Cervical back: Normal range of motion and neck supple.      Right lower leg: No edema.      Left lower leg: No edema.   Lymphadenopathy:      Cervical: No cervical  adenopathy.   Skin:     General: Skin is warm and dry.      Findings: No rash.   Neurological:      Mental Status: She is alert and oriented to person, place, and time.   Psychiatric:         Mood and Affect: Mood normal.         Behavior: Behavior normal.         Assessment & Plan  Pure hypercholesterolemia  Check lipid panel  Rosuvastatin 10mg   Orders:    Lipid Panel    Psychophysiological insomnia  Stable on zolpidem 5mg  UDS - 4/11/25  CSA - 4/11/25  Failed trazodone 150mg, vistaril, refuses remeron 2/2 to weight gain   I have personally reviewed the OARRS report for this person. I find it to be appropriate.   I have considered the risk of abuse, dependence, addiction and diversion.  I believe that it is clinically appropriate for this person to be prescribed this medication for the documented diagnoses.   OARRS report was reviewed on any day a prescription is written for a controlled substance.    Orders:    Drug Screen, Urine With Reflex to Confirmation    Diabetes mellitus screening  Prediabetic, check labs - CMP, hgb A1c   Orders:    Comprehensive Metabolic Panel    POCT glycosylated hemoglobin (Hb A1C) manually resulted    Raynaud's phenomenon without gangrene  Also discussed symptoms that sound very much like Raynaud's   Did discuss calcium channel blockers but given low BP normally without medication do not think it would be worth the risk of hypotension   Also discussed seeing [facility] possibly, could trial compression socks for the feet, she may consider  For her hands recommend gloves, trying to keep hands warm in cold environments              Discussed at visit any disease processes that were of concern as well as the risks, benefits and instructions on any new medication provided. Patient (and/or caretaker of patient if present) stated all questions were answered, and they voiced understanding of instructions.      Courtney Draper PA-C

## 2025-04-13 LAB
1OH-MIDAZOLAM UR-MCNC: NORMAL NG/ML
7AMINOCLONAZEPAM UR-MCNC: NORMAL NG/ML
A-OH ALPRAZ UR-MCNC: NORMAL NG/ML
A-OH-TRIAZOLAM UR-MCNC: NORMAL NG/ML
ALBUMIN SERPL-MCNC: 4.5 G/DL (ref 3.6–5.1)
ALP SERPL-CCNC: 39 U/L (ref 37–153)
ALT SERPL-CCNC: 15 U/L (ref 6–29)
AMOBARBITAL UR-MCNC: NORMAL NG/ML
AMPHET UR-MCNC: NORMAL NG/ML
AMPHETAMINES UR QL: NORMAL
ANION GAP SERPL CALCULATED.4IONS-SCNC: 8 MMOL/L (CALC) (ref 7–17)
AST SERPL-CCNC: 18 U/L (ref 10–35)
BARBITURATES UR QL: NORMAL
BENZODIAZ UR QL: NORMAL
BILIRUB SERPL-MCNC: 0.5 MG/DL (ref 0.2–1.2)
BUN SERPL-MCNC: 19 MG/DL (ref 7–25)
BUTALBITAL UR-MCNC: NORMAL NG/ML
BZE UR QL: NORMAL
BZE UR-MCNC: NORMAL MG/L
CALCIUM SERPL-MCNC: 9.6 MG/DL (ref 8.6–10.4)
CHLORIDE SERPL-SCNC: 103 MMOL/L (ref 98–110)
CHOLEST SERPL-MCNC: 199 MG/DL
CHOLEST/HDLC SERPL: 2.7 (CALC)
CO2 SERPL-SCNC: 29 MMOL/L (ref 20–32)
CODEINE UR-MCNC: NORMAL NG/ML
CREAT SERPL-MCNC: 0.79 MG/DL (ref 0.5–1.05)
CREAT UR-MCNC: NORMAL MG/DL
DRUG SCREEN COMMENT UR-IMP: NORMAL
EDDP UR-MCNC: NORMAL NG/ML
EGFRCR SERPLBLD CKD-EPI 2021: 84 ML/MIN/1.73M2
FENTANYL UR CFM-MCNC: NORMAL NG/ML
FENTANYL UR QL SCN: NORMAL
GLUCOSE SERPL-MCNC: 97 MG/DL (ref 65–99)
HDLC SERPL-MCNC: 74 MG/DL
HYDROCODONE UR-MCNC: NORMAL UG/ML
HYDROMORPHONE UR-MCNC: NORMAL NG/ML
LDLC SERPL CALC-MCNC: 106 MG/DL (CALC)
LORAZEPAM UR-MCNC: NORMAL NG/ML
METHADONE UR QL: NORMAL
METHADONE UR-MCNC: NORMAL UG/L
METHAMPHET UR-MCNC: NORMAL UG/ML
MORPHINE UR-MCNC: NORMAL NG/ML
NONHDLC SERPL-MCNC: 125 MG/DL (CALC)
NORDIAZEPAM UR-MCNC: NORMAL NG/ML
NORFENTANYL UR CFM-MCNC: NORMAL NG/ML
NORHYDROCODONE UR CFM-MCNC: NORMAL NG/ML
NOROXYCODONE UR CFM-MCNC: NORMAL NG/ML
OH-ETHYLFLURAZ UR-MCNC: NORMAL NG/ML
OPIATES UR QL: NORMAL
OXAZEPAM UR-MCNC: NORMAL UG/ML
OXIDANTS UR QL: NORMAL
OXYCODONE UR QL: NORMAL
OXYCODONE UR-MCNC: NORMAL NG/ML
OXYMORPHONE UR-MCNC: NORMAL NG/ML
PCP UR QL: NORMAL
PCP UR-MCNC: NORMAL UG/L
PENTOBARB UR-MCNC: NORMAL UG/ML
PH UR: NORMAL [PH]
PHENOBARB UR-MCNC: NORMAL UG/ML
POTASSIUM SERPL-SCNC: 4.2 MMOL/L (ref 3.5–5.3)
PROT SERPL-MCNC: 6.2 G/DL (ref 6.1–8.1)
QUEST ABNORMAL SPECIMEN VALIDITY TEST:: NORMAL
QUEST NOTES AND COMMENTS: NORMAL
QUEST PATIENT HISTORICAL REPORT: NORMAL
SECOBARBITAL UR-MCNC: NORMAL UG/ML
SODIUM SERPL-SCNC: 140 MMOL/L (ref 135–146)
SP GR UR: NORMAL
TEMAZEPAM UR-MCNC: NORMAL NG/ML
THC UR QL: NORMAL
THC UR-MCNC: NORMAL NG/ML
TRIGL SERPL-MCNC: 96 MG/DL

## 2025-04-15 LAB
ALBUMIN SERPL-MCNC: 4.5 G/DL (ref 3.6–5.1)
ALP SERPL-CCNC: 39 U/L (ref 37–153)
ALT SERPL-CCNC: 15 U/L (ref 6–29)
AMPHETAMINES UR QL: NEGATIVE NG/ML
ANION GAP SERPL CALCULATED.4IONS-SCNC: 8 MMOL/L (CALC) (ref 7–17)
AST SERPL-CCNC: 18 U/L (ref 10–35)
BARBITURATES UR QL: NEGATIVE NG/ML
BENZODIAZ UR QL: NEGATIVE NG/ML
BILIRUB SERPL-MCNC: 0.5 MG/DL (ref 0.2–1.2)
BUN SERPL-MCNC: 19 MG/DL (ref 7–25)
BZE UR QL: NEGATIVE NG/ML
CALCIUM SERPL-MCNC: 9.6 MG/DL (ref 8.6–10.4)
CHLORIDE SERPL-SCNC: 103 MMOL/L (ref 98–110)
CHOLEST SERPL-MCNC: 199 MG/DL
CHOLEST/HDLC SERPL: 2.7 (CALC)
CO2 SERPL-SCNC: 29 MMOL/L (ref 20–32)
CREAT SERPL-MCNC: 0.79 MG/DL (ref 0.5–1.05)
CREAT UR-MCNC: 29 MG/DL
EGFRCR SERPLBLD CKD-EPI 2021: 84 ML/MIN/1.73M2
FENTANYL UR QL SCN: NEGATIVE NG/ML
GLUCOSE SERPL-MCNC: 97 MG/DL (ref 65–99)
HDLC SERPL-MCNC: 74 MG/DL
LDLC SERPL CALC-MCNC: 106 MG/DL (CALC)
METHADONE UR QL: NEGATIVE NG/ML
NONHDLC SERPL-MCNC: 125 MG/DL (CALC)
OPIATES UR QL: NEGATIVE NG/ML
OXIDANTS UR QL: NEGATIVE MCG/ML
OXYCODONE UR QL: NEGATIVE NG/ML
PCP UR QL: NEGATIVE NG/ML
PH UR: 7 [PH] (ref 4.5–9)
POTASSIUM SERPL-SCNC: 4.2 MMOL/L (ref 3.5–5.3)
PROT SERPL-MCNC: 6.2 G/DL (ref 6.1–8.1)
QUEST NOTES AND COMMENTS: NORMAL
SODIUM SERPL-SCNC: 140 MMOL/L (ref 135–146)
THC UR QL: NEGATIVE NG/ML
TRIGL SERPL-MCNC: 96 MG/DL

## 2025-05-01 DIAGNOSIS — F51.04 PSYCHOPHYSIOLOGICAL INSOMNIA: ICD-10-CM

## 2025-05-01 RX ORDER — ZOLPIDEM TARTRATE 5 MG/1
5 TABLET ORAL NIGHTLY PRN
Qty: 30 TABLET | Refills: 0 | Status: SHIPPED | OUTPATIENT
Start: 2025-05-01

## 2025-06-05 DIAGNOSIS — F51.04 PSYCHOPHYSIOLOGICAL INSOMNIA: ICD-10-CM

## 2025-06-06 RX ORDER — ZOLPIDEM TARTRATE 5 MG/1
5 TABLET ORAL NIGHTLY PRN
Qty: 30 TABLET | Refills: 0 | Status: SHIPPED | OUTPATIENT
Start: 2025-06-06

## 2025-06-07 DIAGNOSIS — F51.04 PSYCHOPHYSIOLOGICAL INSOMNIA: ICD-10-CM

## 2025-06-09 RX ORDER — ZOLPIDEM TARTRATE 5 MG/1
5 TABLET ORAL NIGHTLY PRN
Qty: 30 TABLET | Refills: 0 | OUTPATIENT
Start: 2025-06-09

## 2025-07-08 DIAGNOSIS — F51.04 PSYCHOPHYSIOLOGICAL INSOMNIA: ICD-10-CM

## 2025-07-10 RX ORDER — ZOLPIDEM TARTRATE 5 MG/1
5 TABLET ORAL NIGHTLY PRN
Qty: 30 TABLET | Refills: 0 | Status: SHIPPED | OUTPATIENT
Start: 2025-07-10

## 2025-08-04 DIAGNOSIS — F51.04 PSYCHOPHYSIOLOGICAL INSOMNIA: ICD-10-CM

## 2025-08-05 RX ORDER — ZOLPIDEM TARTRATE 5 MG/1
5 TABLET ORAL NIGHTLY PRN
Qty: 30 TABLET | Refills: 4 | Status: SHIPPED | OUTPATIENT
Start: 2025-08-05